# Patient Record
(demographics unavailable — no encounter records)

---

## 2024-11-29 NOTE — HISTORY OF PRESENT ILLNESS
[de-identified] : MAYE MEDINA is a 33 year old male with Moderate Factor VIII Deficiency (3%) infuses on demand with [facility]DO Vergara who presents today for annual comprehensive visit. No reported bleeds since last visit. He is interested in switching to a non-factor product for prophylaxis. No upcoming procedures. He is current with dental. Doesn't have a PCP and agreed to screenings today. He received annual flu and covid vaccine at work.  Denies gum bleeds, nosebleed, hematuria, melena/hematochezia.

## 2024-11-29 NOTE — ASSESSMENT
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  -Education provided on Hemophilia, its implications and genetics. -Discussed mechanism of action of Emicizumab (Hemlibra), difference between Hemlibra and Factor, and Hemlibra prevents a bleed and factor is needed to treat a bleed. Verbalized understanding. -Any head trauma mild or severe should prompt an emergent visit to the ER for a Factor infusion and head CT. -Reviewed symptoms and signs of joint bleeds, muscle bleeds, and ICH and appropriate interventions, such as RICE, Factor infusion. Reviewed the brand of Factor and dosing. -Call the [facility] immediately with any bleeding sign/symptom -Recommend calling [facility] prior to procedures/surgeries for treatment recommendations, including dental extraction. -Counseled against NSAID and ASA use, they cause qualitative platelet dysfunction and can increase bleeding risks. -Counseled against inebriation to prevent trauma and ICH which can be fatal -recommend calling [facility] at least 2 weeks ahead of time for medication order renewals to prevent running out product. -Discussed to bring factor when traveling away from home for vacation or long weekends. Not every hospital has factor. -Discussed to always check expiration dates on factor and have unexpired factor at home. -Assisted patient in setting up phone Medic Alert omar. -Any new physical activity whether on a team, with friends, or at the gym should be discussed with the [employer]. Provided the [employer] guide and discussed options.  General Follow up with dental. Doesn't have a PCP. screenings performed at [facility]. 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 Referral given. PCP screenings labs will be drawn.  -Annual comp visits. Met with interdisciplinary team

## 2024-11-29 NOTE — PHYSICAL EXAM
[Normal] : no cervical lymphadenopathy [Normal] : awake and interactive, normal strength tone throughout. [de-identified] : See PT note

## 2024-11-29 NOTE — HISTORY OF PRESENT ILLNESS
[de-identified] : MAYE MEDINA is a 33 year old male with Moderate Factor VIII Deficiency (3%) infuses on demand with [facility]DO Vergara who presents today for annual comprehensive visit. No reported bleeds since last visit. He is interested in switching to a non-factor product for prophylaxis. No upcoming procedures. He is current with dental. Doesn't have a PCP and agreed to screenings today. He received annual flu and covid vaccine at work.  Denies gum bleeds, nosebleed, hematuria, melena/hematochezia.

## 2024-11-29 NOTE — PHYSICAL EXAM
[Normal] : no cervical lymphadenopathy [Normal] : awake and interactive, normal strength tone throughout. [de-identified] : See PT note

## 2024-11-29 NOTE — ASSESSMENT
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  -Education provided on Hemophilia, its implications and genetics. -Discussed mechanism of action of Emicizumab (Hemlibra), difference between Hemlibra and Factor, and Hemlibra prevents a bleed and factor is needed to treat a bleed. Verbalized understanding. -Any head trauma mild or severe should prompt an emergent visit to the ER for a Factor infusion and head CT. -Reviewed symptoms and signs of joint bleeds, muscle bleeds, and ICH and appropriate interventions, such as RICE, Factor infusion. Reviewed the brand of Factor and dosing. -Call the [facility] immediately with any bleeding sign/symptom -Recommend calling [facility] prior to procedures/surgeries for treatment recommendations, including dental extraction. -Counseled against NSAID and ASA use, they cause qualitative platelet dysfunction and can increase bleeding risks. -Counseled against inebriation to prevent trauma and ICH which can be fatal -recommend calling [facility] at least 2 weeks ahead of time for medication order renewals to prevent running out product. -Discussed to bring factor when traveling away from home for vacation or long weekends. Not every hospital has factor. -Discussed to always check expiration dates on factor and have unexpired factor at home. -Assisted patient in setting up phone Medic Alert omar. -Any new physical activity whether on a team, with friends, or at the gym should be discussed with the [employer]. Provided the Play It Safe guide and discussed options.  General Follow up with dental. Doesn't have a PCP. screenings performed at [facility]. 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 Referral given. PCP screenings labs will be drawn.  -Annual comp visits. Met with interdisciplinary team